# Patient Record
Sex: FEMALE | URBAN - METROPOLITAN AREA
[De-identification: names, ages, dates, MRNs, and addresses within clinical notes are randomized per-mention and may not be internally consistent; named-entity substitution may affect disease eponyms.]

---

## 2020-02-03 ENCOUNTER — OFFICE VISIT (OUTPATIENT)
Dept: URBAN - METROPOLITAN AREA CLINIC 29 | Facility: CLINIC | Age: 45
End: 2020-02-03
Payer: COMMERCIAL

## 2020-02-03 DIAGNOSIS — E83.10 DISORDER OF IRON METABOLISM, UNSPECIFIED: Primary | ICD-10-CM

## 2020-02-03 PROCEDURE — 92004 COMPRE OPH EXAM NEW PT 1/>: CPT | Performed by: OPTOMETRIST

## 2020-02-03 ASSESSMENT — INTRAOCULAR PRESSURE
OS: 12
OD: 12

## 2020-02-03 NOTE — IMPRESSION/PLAN
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 Plan: Discussed diagnosis, explained and understood. No treatment recommended at this time. no jaundice seen and no Leoncio Linea rings seen. continue to monitor condition and symptoms.